# Patient Record
Sex: FEMALE | Race: ASIAN | Employment: UNEMPLOYED | URBAN - METROPOLITAN AREA
[De-identification: names, ages, dates, MRNs, and addresses within clinical notes are randomized per-mention and may not be internally consistent; named-entity substitution may affect disease eponyms.]

---

## 2023-03-05 NOTE — LETTER
Date: July 10, 2025      Patient Name: Hari Maher      : 3/15/1985        Thank you for choosing [facility] as your health care provider. Your physician has deemed the following medical service(s) necessary. However, your insurance plan may not pay for all of your health care and costs and may deny payment for this service. The fact that your insurance plan does not pay for an item or service does not mean you should not receive it. The purpose of this form is to help you make an informed decision about whether or not you want to receive this service(s) that may not be paid for by your insurance plan.    CPT Code Description     Cost     _________ ______________________________  _____________      _________ ______________________________ _____________      _________ ______________________________ _____________      I understand that the above mentioned service(s) or supply may not be covered by my insurance company. I agree to be financially responsible for the cost of this service or supply in the event of my insurance denies payment as a non-covered benefit.        ______________________________________________________________________  Signature of Patient or Patient's Representative  Relationship  Date    ______________________________________________________________________  Signature of Witness to signing of form   Printed Name  
Cigarettes
Abdomen soft, non-tender, no guarding.

## 2023-09-19 ENCOUNTER — OFFICE VISIT (OUTPATIENT)
Dept: FAMILY MEDICINE CLINIC | Facility: CLINIC | Age: 38
End: 2023-09-19
Payer: MEDICAID

## 2023-09-19 VITALS
HEIGHT: 61 IN | DIASTOLIC BLOOD PRESSURE: 58 MMHG | TEMPERATURE: 98 F | BODY MASS INDEX: 20.77 KG/M2 | HEART RATE: 66 BPM | WEIGHT: 110 LBS | OXYGEN SATURATION: 99 % | RESPIRATION RATE: 18 BRPM | SYSTOLIC BLOOD PRESSURE: 92 MMHG

## 2023-09-19 DIAGNOSIS — Z13.1 SCREENING FOR DIABETES MELLITUS: ICD-10-CM

## 2023-09-19 DIAGNOSIS — Z00.00 WELLNESS EXAMINATION: Primary | ICD-10-CM

## 2023-09-19 DIAGNOSIS — Z11.1 SCREENING FOR TUBERCULOSIS: ICD-10-CM

## 2023-09-19 DIAGNOSIS — Z23 NEED FOR INFLUENZA VACCINATION: ICD-10-CM

## 2023-09-19 DIAGNOSIS — Z00.00 LABORATORY EXAM ORDERED AS PART OF ROUTINE GENERAL MEDICAL EXAMINATION: ICD-10-CM

## 2023-09-19 PROCEDURE — 3078F DIAST BP <80 MM HG: CPT | Performed by: FAMILY MEDICINE

## 2023-09-19 PROCEDURE — 3008F BODY MASS INDEX DOCD: CPT | Performed by: FAMILY MEDICINE

## 2023-09-19 PROCEDURE — 90686 IIV4 VACC NO PRSV 0.5 ML IM: CPT | Performed by: FAMILY MEDICINE

## 2023-09-19 PROCEDURE — 3074F SYST BP LT 130 MM HG: CPT | Performed by: FAMILY MEDICINE

## 2023-09-19 PROCEDURE — 90471 IMMUNIZATION ADMIN: CPT | Performed by: FAMILY MEDICINE

## 2023-09-19 PROCEDURE — 99385 PREV VISIT NEW AGE 18-39: CPT | Performed by: FAMILY MEDICINE

## 2023-09-19 NOTE — PATIENT INSTRUCTIONS
Go to the [facility] for your fasting blood tests. Do not eat or drink except for water for at least 8 hours prior to the blood tests. Do not take any vitamins or Biotin for 3 days before your blood tests. Recommendations for exercise are 3-5 times weekly for 30-60 minutes for a minimum of 150-300 minutes. For premenopausal women and men, 1000 mg of calcium daily is recommended. For postmenopausal women, 1200 mg of calcium daily is recommended. To help the body absorb and use calcium, vitamin D 2000 international units daily is recommended. I will contact you with your test results once available.     I will complete your work form once I have the results of your TB test.

## 2023-09-20 ENCOUNTER — LAB ENCOUNTER (OUTPATIENT)
Dept: LAB | Age: 38
End: 2023-09-20
Attending: FAMILY MEDICINE
Payer: MEDICAID

## 2023-09-20 DIAGNOSIS — Z13.1 SCREENING FOR DIABETES MELLITUS: ICD-10-CM

## 2023-09-20 DIAGNOSIS — Z11.1 SCREENING FOR TUBERCULOSIS: ICD-10-CM

## 2023-09-20 DIAGNOSIS — Z00.00 LABORATORY EXAM ORDERED AS PART OF ROUTINE GENERAL MEDICAL EXAMINATION: ICD-10-CM

## 2023-09-20 LAB
ALBUMIN SERPL-MCNC: 3.9 G/DL (ref 3.4–5)
ALBUMIN/GLOB SERPL: 1 {RATIO} (ref 1–2)
ALP LIVER SERPL-CCNC: 81 U/L
ALT SERPL-CCNC: 23 U/L
ANION GAP SERPL CALC-SCNC: 1 MMOL/L (ref 0–18)
AST SERPL-CCNC: 8 U/L (ref 15–37)
BASOPHILS # BLD AUTO: 0.03 X10(3) UL (ref 0–0.2)
BASOPHILS NFR BLD AUTO: 0.3 %
BILIRUB SERPL-MCNC: 0.5 MG/DL (ref 0.1–2)
BUN BLD-MCNC: 12 MG/DL (ref 7–18)
CALCIUM BLD-MCNC: 8.9 MG/DL (ref 8.5–10.1)
CHLORIDE SERPL-SCNC: 105 MMOL/L (ref 98–112)
CHOLEST SERPL-MCNC: 147 MG/DL (ref ?–200)
CO2 SERPL-SCNC: 31 MMOL/L (ref 21–32)
CREAT BLD-MCNC: 0.5 MG/DL
EGFRCR SERPLBLD CKD-EPI 2021: 122 ML/MIN/1.73M2 (ref 60–?)
EOSINOPHIL # BLD AUTO: 0.02 X10(3) UL (ref 0–0.7)
EOSINOPHIL NFR BLD AUTO: 0.2 %
ERYTHROCYTE [DISTWIDTH] IN BLOOD BY AUTOMATED COUNT: 12.2 %
FASTING PATIENT LIPID ANSWER: YES
FASTING STATUS PATIENT QL REPORTED: YES
GLOBULIN PLAS-MCNC: 4.1 G/DL (ref 2.8–4.4)
GLUCOSE BLD-MCNC: 89 MG/DL (ref 70–99)
HCT VFR BLD AUTO: 37.4 %
HCV AB SERPL QL IA: NONREACTIVE
HDLC SERPL-MCNC: 55 MG/DL (ref 40–59)
HGB BLD-MCNC: 11.7 G/DL
IMM GRANULOCYTES # BLD AUTO: 0.02 X10(3) UL (ref 0–1)
IMM GRANULOCYTES NFR BLD: 0.2 %
LDLC SERPL CALC-MCNC: 80 MG/DL (ref ?–100)
LYMPHOCYTES # BLD AUTO: 1.65 X10(3) UL (ref 1–4)
LYMPHOCYTES NFR BLD AUTO: 18.7 %
MCH RBC QN AUTO: 28.3 PG (ref 26–34)
MCHC RBC AUTO-ENTMCNC: 31.3 G/DL (ref 31–37)
MCV RBC AUTO: 90.3 FL
MONOCYTES # BLD AUTO: 0.76 X10(3) UL (ref 0.1–1)
MONOCYTES NFR BLD AUTO: 8.6 %
NEUTROPHILS # BLD AUTO: 6.33 X10 (3) UL (ref 1.5–7.7)
NEUTROPHILS # BLD AUTO: 6.33 X10(3) UL (ref 1.5–7.7)
NEUTROPHILS NFR BLD AUTO: 72 %
NONHDLC SERPL-MCNC: 92 MG/DL (ref ?–130)
OSMOLALITY SERPL CALC.SUM OF ELEC: 283 MOSM/KG (ref 275–295)
PLATELET # BLD AUTO: 309 10(3)UL (ref 150–450)
POTASSIUM SERPL-SCNC: 4.1 MMOL/L (ref 3.5–5.1)
PROT SERPL-MCNC: 8 G/DL (ref 6.4–8.2)
RBC # BLD AUTO: 4.14 X10(6)UL
SODIUM SERPL-SCNC: 137 MMOL/L (ref 136–145)
TRIGL SERPL-MCNC: 58 MG/DL (ref 30–149)
TSI SER-ACNC: 0.47 MIU/ML (ref 0.36–3.74)
VLDLC SERPL CALC-MCNC: 9 MG/DL (ref 0–30)
WBC # BLD AUTO: 8.8 X10(3) UL (ref 4–11)

## 2023-09-20 PROCEDURE — 85025 COMPLETE CBC W/AUTO DIFF WBC: CPT

## 2023-09-20 PROCEDURE — 86480 TB TEST CELL IMMUN MEASURE: CPT

## 2023-09-20 PROCEDURE — 84443 ASSAY THYROID STIM HORMONE: CPT

## 2023-09-20 PROCEDURE — 83036 HEMOGLOBIN GLYCOSYLATED A1C: CPT

## 2023-09-20 PROCEDURE — 86803 HEPATITIS C AB TEST: CPT

## 2023-09-20 PROCEDURE — 36415 COLL VENOUS BLD VENIPUNCTURE: CPT

## 2023-09-20 PROCEDURE — 80061 LIPID PANEL: CPT

## 2023-09-20 PROCEDURE — 80053 COMPREHEN METABOLIC PANEL: CPT

## 2023-09-21 LAB
EST. AVERAGE GLUCOSE BLD GHB EST-MCNC: 117 MG/DL (ref 68–126)
HBA1C MFR BLD: 5.7 % (ref ?–5.7)

## 2023-09-22 LAB
M TB IFN-G CD4+ T-CELLS BLD-ACNC: 0.27 IU/ML
M TB TUBERC IFN-G BLD QL: NEGATIVE
M TB TUBERC IGNF/MITOGEN IGNF CONTROL: >10 IU/ML
QFT TB1 AG MINUS NIL: 0.02 IU/ML
QFT TB2 AG MINUS NIL: 0.06 IU/ML

## 2023-09-27 ENCOUNTER — MED REC SCAN ONLY (OUTPATIENT)
Dept: FAMILY MEDICINE CLINIC | Facility: CLINIC | Age: 38
End: 2023-09-27

## 2024-09-05 ENCOUNTER — OFFICE VISIT (OUTPATIENT)
Dept: FAMILY MEDICINE CLINIC | Facility: CLINIC | Age: 39
End: 2024-09-05
Payer: MEDICAID

## 2024-09-05 VITALS
HEIGHT: 61 IN | TEMPERATURE: 98 F | BODY MASS INDEX: 21.14 KG/M2 | RESPIRATION RATE: 18 BRPM | WEIGHT: 112 LBS | OXYGEN SATURATION: 99 % | HEART RATE: 58 BPM | SYSTOLIC BLOOD PRESSURE: 102 MMHG | DIASTOLIC BLOOD PRESSURE: 58 MMHG

## 2024-09-05 DIAGNOSIS — Z12.31 ENCOUNTER FOR SCREENING MAMMOGRAM FOR MALIGNANT NEOPLASM OF BREAST: ICD-10-CM

## 2024-09-05 DIAGNOSIS — N89.8 VAGINAL DISCHARGE: ICD-10-CM

## 2024-09-05 DIAGNOSIS — Z13.1 SCREENING FOR DIABETES MELLITUS: ICD-10-CM

## 2024-09-05 DIAGNOSIS — N30.00 ACUTE CYSTITIS WITHOUT HEMATURIA: ICD-10-CM

## 2024-09-05 DIAGNOSIS — Z30.431 IUD CHECK UP: ICD-10-CM

## 2024-09-05 DIAGNOSIS — R30.0 DYSURIA: ICD-10-CM

## 2024-09-05 DIAGNOSIS — Z00.00 WELLNESS EXAMINATION: Primary | ICD-10-CM

## 2024-09-05 DIAGNOSIS — Z00.00 LABORATORY EXAM ORDERED AS PART OF ROUTINE GENERAL MEDICAL EXAMINATION: ICD-10-CM

## 2024-09-05 LAB
APPEARANCE: CLEAR
BILIRUBIN: NEGATIVE
GLUCOSE (URINE DIPSTICK): NEGATIVE MG/DL
KETONES (URINE DIPSTICK): NEGATIVE MG/DL
MULTISTIX LOT#: ABNORMAL NUMERIC
NITRITE, URINE: NEGATIVE
PH, URINE: 7.5 (ref 4.5–8)
PROTEIN (URINE DIPSTICK): NEGATIVE MG/DL
SPECIFIC GRAVITY: 1.02 (ref 1–1.03)
URINE-COLOR: YELLOW
UROBILINOGEN,SEMI-QN: 0.2 MG/DL (ref 0–1.9)

## 2024-09-05 PROCEDURE — 81514 NFCT DS BV&VAGINITIS DNA ALG: CPT | Performed by: FAMILY MEDICINE

## 2024-09-05 PROCEDURE — 87086 URINE CULTURE/COLONY COUNT: CPT | Performed by: FAMILY MEDICINE

## 2024-09-05 RX ORDER — NITROFURANTOIN 25; 75 MG/1; MG/1
100 CAPSULE ORAL 2 TIMES DAILY
Qty: 14 CAPSULE | Refills: 0 | Status: SHIPPED | OUTPATIENT
Start: 2024-09-05

## 2024-09-05 NOTE — PROGRESS NOTES
The 21st Century Cures Act makes medical notes like these available to patients in the interest of transparency. Please be advised this is a medical document. Medical documents are intended to carry relevant information, facts as evident, and the clinical opinion of the practitioner. The medical note is intended as peer to peer communication and may appear blunt or direct. It is written in medical language and may contain abbreviations or verbiage that are unfamiliar.     Hari Maher is a 39 year old female who is here for   Chief Complaint   Patient presents with    UTI    Well Adult       HPI:     This 39-year-old female presents to the office for her annual wellness exam.    The patient is complaining of a 2-week history of urinary urgency, frequency and some lower abdominal pain both right lower quadrant and suprapubic.  She denies any associated fever, chills, hematuria, flank pain.  She has no past history for kidney stones.  She has been using an over-the-counter medication similar to Azo for the discomfort because she states it turns her urine orange.  She has also had some vaginal discharge.  She denies any new sexual partners.  She has no past history for STIs.  She does have an IUD in place.  The patient has not been to her gynecologist for an IUD check.    She is currently denying any fever, chills, cough, sore throat, runny nose, ear pain, chest pain, shortness of breath, nausea, vomiting, diarrhea, melena, hematochezia, history for anemia or thyroid problems.    The history and physical is assisted with the [place]  via the language line.    Exercise:  twice per week. At home Diet: doesn't watch  Sleep: 8 hours     Depression/Anxiety:   PHQ-2 SCORE: 0  , done 9/5/2024   Last [place] Suicide Screening on 9/5/2024 was No Risk.     Fall Risk: No falls last 3 months  Gun in home:No            Glasses/contacts:No             Recent eye doctor visit:2 years ago   Hearing aids:No    Family  History for:  Breast ca- No  Ovarian ca- No  Uterine ca- No  Colon ca-No     1 miscarriage,   FDLMP 2024, IUD in place  Last pap smear:  normal per patient  Last Mammogram: years    Colonoscopy: Never      History reviewed. No pertinent past medical history.    Past Surgical History:   Procedure Laterality Date             Family History   Problem Relation Age of Onset    Stroke Father        Social History     Socioeconomic History    Marital status:    Tobacco Use    Smoking status: Never    Smokeless tobacco: Never   Vaping Use    Vaping status: Never Used   Substance and Sexual Activity    Alcohol use: Not Currently    Drug use: Never    Sexual activity: Yes   Other Topics Concern    Caffeine Concern No    Exercise Yes    Seat Belt Yes    Special Diet No    Stress Concern No    Weight Concern No         Medications:    No current outpatient medications on file prior to visit.     No current facility-administered medications on file prior to visit.       Allergies:    No Known Allergies    REVIEW OF SYSTEMS:     See HPI for relevant ROS  GENERAL HEALTH: no other complaints  NEURO: no other complaints  VISION: no other complaints  RESPIRATORY: no other complaints  CARDIOVASCULAR: no other complaints  GI: no other complaints  : no other complaints  SKIN: no other complaints  PSYCH: no other complaints  EXT: no other complaints        EXAM:   /58 (BP Location: Left arm, Patient Position: Sitting, Cuff Size: adult)   Pulse 58   Temp 98 °F (36.7 °C)   Resp 18   Ht 5' 1\" (1.549 m)   Wt 112 lb (50.8 kg)   LMP 2024 (Approximate)   SpO2 99%   Breastfeeding No   BMI 21.16 kg/m²     Wt Readings from Last 3 Encounters:   24 112 lb (50.8 kg)   23 110 lb (49.9 kg)       BP Readings from Last 3 Encounters:   24 102/58   23 92/58        Visual Acuity  Right Eye Visual Acuity: Uncorrected Right Eye Chart Acuity: 20/20   Left Eye Visual Acuity:  Uncorrected Left Eye Chart Acuity: 20/20   Both Eyes Visual Acuity: Uncorrected Both Eyes Chart Acuity: 20/20   Able To Tolerate Visual Acuity: Yes      General: WH/WN/WD, in NAD, A and O times 3  HEAD: Normocephalic, atraumatic  EYES: RAISSA, EOMI, conjunctiva normal, sclera anicteric.  EARS: Tympanic membranes normal, EAC's normal.  NOSE: Turbninates normal, no bleeding noted.  PHARYNX:  No eythema or exudates, mucous membrane moist, airway patent.  NECK:  No cervical lymphadenopathy or thyromegaly, no bruits noted.  HEART: Regular rate and rhythm, no S3, S4 or murmur noted.  LUNGS: Clear to ausculation. No retractions or tachypnea noted.  BREASTS: Deferred-to see GYN   ABDOMEN: Soft, mild suprapubic tenderness, no guarding, rigidity or rebound, no masses or hepatosplenomegaly, normal bowel sounds in all four quadrants.  : External genitalia normal, no rash or lesions noted. Speculum exam shows cervix with nabothian cyst ,without lesions or bleeding, I was unable to see the IUD strings, vaginal vault with minimal greenish mucoid discharge is noted. Bimanual exam: uterus is normal size, no CMT, no adnexal masses or tenderness noted.  BACK: No tenderness over the thoracic or lumbar spine. No scoliosis noted.  EXTREMITIES: No clubbing, cyanosis, edema noted. Motor strength +5/5 in all 4 extremities. DTR's +2/4 in all 4 extremities. Able to toe and heel walk.  SKIN: Warm and dry.  NEURO: Cr. N. II-XII intact, normal gait  PSYCH: Normal affect and mood.    The 10-year ASCVD risk score (Norma PIKE, et al., 2019) is: 0.2%    Values used to calculate the score:      Age: 39 years      Sex: Female      Is Non- : No      Diabetic: No      Tobacco smoker: No      Systolic Blood Pressure: 102 mmHg      Is BP treated: No      HDL Cholesterol: 55 mg/dL      Total Cholesterol: 147 mg/dL    ASSESSMENT AND PLAN:     1. Wellness examination  -We discussed the following:  Healthy diet and exercise, cancer  screening, self breast exams and calcium and vitamin D supplementation.  The patient was reminded to get her flu shot and the new COVID booster in October at her local pharmacy.    2. Laboratory exam ordered as part of routine general medical examination    - CBC With Differential With Platelet; Future  - Comp Metabolic Panel (14); Future  - Lipid Panel; Future  - TSH W Reflex To Free T4; Future    3. Screening for diabetes mellitus    - Hemoglobin A1C [E]; Future    4. Encounter for screening mammogram for malignant neoplasm of breast    - [facility] JAVON 2D+3D SCREENING BILAT (CPT=77067/61065); Future    5. Acute cystitis without hematuria  6. Dysuria    Recent Results (from the past 24 hour(s))   Urine Dip, auto without Micro    Collection Time: 09/05/24 10:58 AM   Result Value Ref Range    Glucose Urine Negative Negative mg/dL    Bilirubin Urine Negative Negative    Ketones, UA Negative Negative - Trace mg/dL    Spec Gravity 1.020 1.005 - 1.030    Blood Urine Small (A) Negative    PH Urine 7.5 5.0 - 8.0    Protein Urine Negative Negative - Trace mg/dL    Urobilinogen Urine 0.2 0.2 - 1.0 mg/dL    Nitrite Urine Negative Negative    Leukocyte Esterase Urine Large (A) Negative    APPEARANCE clear Clear    Color Urine yellow Yellow    Multistix Lot# [id number] Numeric    Multistix Expiration Date 04/30/25 Date     I discussed the results of the urinalysis with the patient.  Urine culture is sent.  Preventive measures for UTI are reviewed.  I will treat with Macrobid 100 mg 1 capsule twice daily for 7 days.  The patient was advised to follow-up if  her symptoms do not resolve after completing the antibiotics or sooner if she is having new or worsening symptoms.    - Urine Culture, Routine [E]; Future  - nitrofurantoin monohydrate macro 100 MG Oral Cap; Take 1 capsule (100 mg total) by mouth 2 (two) times daily.  Dispense: 14 capsule; Refill: 0  - Urine Culture, Routine [E]  - Urine Dip, auto without Micro      7. Vaginal  discharge    I told the patient I would await the results of her vaginitis panel before I would recommend any other treatment.    - Vaginitis Vaginosis PCR Panel; Future  - Vaginitis Vaginosis PCR Panel    8. IUD check up    As I was unable to identify the IUD strings today, I have referred her to Dr. Brown for an IUD check.    - OBG Referral - In Network         Health Maintenance:    Health Maintenance   Topic Date Due    Mammogram  Never done    Pap Smear  Never done    Annual Depression Screening  Completed    COVID-19 Vaccine (1 - 2023-24 season) Never done    Annual Physical  09/05/2025    DTaP,Tdap,and Td Vaccines (1 - Tdap) 09/19/2024 (Originally [birth year]/15/2003)    Influenza Vaccine (1) 10/01/2024    Pneumococcal Vaccine: Birth to 64yrs  Aged Out         Orders This Visit:  Orders Placed This Encounter   Procedures    CBC With Differential With Platelet    Comp Metabolic Panel (14)    Lipid Panel    TSH W Reflex To Free T4    Hemoglobin A1C [E]    Urine Dip, auto without Micro    Urine Culture, Routine [E]    Vaginitis Vaginosis PCR Panel       Meds This Visit:  Requested Prescriptions     Signed Prescriptions Disp Refills    nitrofurantoin monohydrate macro 100 MG Oral Cap 14 capsule 0     Sig: Take 1 capsule (100 mg total) by mouth 2 (two) times daily.       Imaging & Referrals:  OBG - INTERNAL  [facility] JAVON 2D+3D SCREENING BILAT (CPT=77067/21214)       The patient indicates understanding of these issues and agrees to the plan.  The patient is asked to return in pending lab results or sooner for any new or worsening symptoms.  Yesenia Madrid DO    Total time: 60 minutes including precharting, H&P, plan of care.      This dictation was performed with a verbal recognition program (DRAGON) and it was checked for errors. It is possible that there are still dictated errors within this office note. If so, please bring any errors to my attention for an addendum. All efforts were made to ensure that this office note is  accurate

## 2024-09-05 NOTE — PATIENT INSTRUCTIONS
Go to the [facility] lab for your fasting blood tests. Do not eat or drink except for water for at least 8 hours prior to the blood tests. Do not take any vitamins or Biotin for 3 days before your blood tests.    Schedule your mammogram as ordered.    Schedule your appointment with the gynecologist, Dr. Brown or one of her partners for check on your IUD.    Take the Macrobid 100 mg one capsule twice daily for one week to treat the urine infection.  Contact the office if your symptoms do not resolve after finishing the medication.    Drink 64 ounces of water daily. Limit caffeine intake.  Empty your bladder after sex. No bubble baths, hot tubs, swimming while symptomatic. A urine culture was sent and we will contact you if any adjustments in your medication needs to be done based on the culture results. Otherwise, take all your medications until completed.      Recommendations for exercise are 3-5 times weekly for 30-60 minutes for a minimum of 150-300 minutes.     For premenopausal women and men, 1000 mg of calcium daily is recommended. For postmenopausal women, 1200 mg of calcium daily is recommended.    To help the body absorb and use calcium, vitamin D 2000 international units daily is recommended.    I will contact you with your test results once available.

## 2024-09-06 LAB
BV BACTERIA DNA VAG QL NAA+PROBE: NEGATIVE
C GLABRATA DNA VAG QL NAA+PROBE: NEGATIVE
C KRUSEI DNA VAG QL NAA+PROBE: NEGATIVE
CANDIDA DNA VAG QL NAA+PROBE: NEGATIVE
T VAGINALIS DNA VAG QL NAA+PROBE: NEGATIVE

## 2025-07-10 ENCOUNTER — OFFICE VISIT (OUTPATIENT)
Facility: CLINIC | Age: 40
End: 2025-07-10
Payer: MEDICAID

## 2025-07-10 ENCOUNTER — HOSPITAL ENCOUNTER (OUTPATIENT)
Dept: MAMMOGRAPHY | Facility: HOSPITAL | Age: 40
Discharge: HOME | End: 2025-07-10
Attending: FAMILY MEDICINE
Payer: MEDICAID

## 2025-07-10 VITALS
WEIGHT: 116 LBS | BODY MASS INDEX: 21.9 KG/M2 | HEART RATE: 63 BPM | SYSTOLIC BLOOD PRESSURE: 106 MMHG | DIASTOLIC BLOOD PRESSURE: 66 MMHG | HEIGHT: 61 IN

## 2025-07-10 DIAGNOSIS — Z12.31 ENCOUNTER FOR SCREENING MAMMOGRAM FOR MALIGNANT NEOPLASM OF BREAST: ICD-10-CM

## 2025-07-10 DIAGNOSIS — Z30.431 IUD CHECK UP: Primary | ICD-10-CM

## 2025-07-10 DIAGNOSIS — N64.4 BREAST PAIN, LEFT: ICD-10-CM

## 2025-07-10 PROCEDURE — 77067 SCR MAMMO BI INCL CAD: CPT | Performed by: FAMILY MEDICINE

## 2025-07-10 PROCEDURE — 77063 BREAST TOMOSYNTHESIS BI: CPT | Performed by: FAMILY MEDICINE

## 2025-07-10 PROCEDURE — 99212 OFFICE O/P EST SF 10 MIN: CPT

## 2025-07-10 PROCEDURE — 99459 PELVIC EXAMINATION: CPT

## 2025-07-10 NOTE — PROGRESS NOTES
Hari Maher is a 40 year old female  Patient's last menstrual period was 07/10/2025 (approximate).   Chief Complaint   Patient presents with    Breast Pain     Has breast pain on left side, it does concide with her ovulation.      .She has left breast pain, it happens right before her ovulation cycle. But now for the past 2 months, the pain has been consistent.     She was also referred by her PCP, last 2024, her PCP did a pelvic exam and was unable to see IUD strings. Pt reports she has the Paragard IUD.     OBSTETRICS HISTORY:  OB History    Para Term  AB Living   5 4   1 4   SAB IAB Ectopic Multiple Live Births       4      # Outcome Date GA Lbr Ilir/2nd Weight Sex Type Anes PTL Lv   5 Para 2020    F NORMAL SPONT None  [place]   4 Para 2015    F NORMAL SPONT None  [place]   3 Para 2006    F NORMAL SPONT None  [place]   2 Para 2004    M NORMAL SPONT None  [place]   1 AB                GYNE HISTORY:  Periods regular monthly    History   Sexual Activity    Sexual activity: Yes    Partners: Male        Hx Prior Abnormal Pap: No  Pap Result Notes: Pt states its been more than 3 years ago        MEDICAL HISTORY:  Past Medical History[1]    SURGICAL HISTORY:  Past Surgical History[2]    SOCIAL HISTORY:  Social History     Socioeconomic History    Marital status:      Spouse name: Not on file    Number of children: Not on file    Years of education: Not on file    Highest education level: Not on file   Occupational History    Not on file   Tobacco Use    Smoking status: Never    Smokeless tobacco: Never   Vaping Use    Vaping status: Never Used   Substance and Sexual Activity    Alcohol use: Not Currently    Drug use: Never    Sexual activity: Yes     Partners: Male   Other Topics Concern    Caffeine Concern Yes    Exercise Yes    Seat Belt Yes    Special Diet No    Stress Concern No    Weight Concern No     Service Not Asked    Blood Transfusions Not Asked    Occupational Exposure  Not Asked    Hobby Hazards Not Asked    Sleep Concern Not Asked    Back Care Not Asked    Bike Helmet Not Asked    Self-Exams Not Asked   Social History Narrative    Not on file     Social Drivers of Health     Food Insecurity: No Food Insecurity (7/10/2025)    NCSS - Food Insecurity     Worried About Running Out of Food in the Last Year: No     Ran Out of Food in the Last Year: No   Transportation Needs: No Transportation Needs (7/10/2025)    NCSS - Transportation     Lack of Transportation: No   Stress: Not on file   Housing Stability: Not At Risk (7/10/2025)    NCSS - Housing/Utilities     Has Housing: Yes     Worried About Losing Housing: No     Unable to Get Utilities: No       FAMILY HISTORY:  Family History[3]    MEDICATIONS:  Medications - Current[4]    ALLERGIES:  Allergies[5]      Physical Exam  Chest:   Breasts:     Left: Tenderness present. No swelling, bleeding, inverted nipple, mass, nipple discharge or skin change.      Comments: Has tenderness at the 3 oclock position of left breast.              Pelvic Exam:  External Genitalia: normal appearance, hair distribution, and no lesions  Urethral Meatus:  normal in size, location, without lesions and prolapse  Bladder:  No fullness, masses or tenderness  Vagina:  Normal appearance without lesions, no abnormal discharge  Cervix:  Normal without tenderness on motion.BROWN IUD strings seen   Perineum: normal  Anus: no hemorroids     Assessment & Plan:  1. IUD check up      2. Breast pain, left        Discussed the following with pt:  -completed her screening mammogram today, will wait for results.   -informed pt she has the Mirena IUD, not the Paragard.              [1] History reviewed. No pertinent past medical history.  [2]   Past Surgical History:  Procedure Laterality Date         [3]   Family History  Problem Relation Age of Onset    Stroke Father    [4]   Current Outpatient Medications:     nitrofurantoin monohydrate macro 100 MG Oral Cap, Take 1  capsule (100 mg total) by mouth 2 (two) times daily. (Patient not taking: Reported on 7/10/2025), Disp: 14 capsule, Rfl: 0  [5] No Known Allergies